# Patient Record
Sex: MALE | Race: WHITE | NOT HISPANIC OR LATINO | Employment: UNEMPLOYED | URBAN - METROPOLITAN AREA
[De-identification: names, ages, dates, MRNs, and addresses within clinical notes are randomized per-mention and may not be internally consistent; named-entity substitution may affect disease eponyms.]

---
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
## 2024-04-13 ENCOUNTER — OFFICE VISIT (OUTPATIENT)
Dept: URGENT CARE | Facility: CLINIC | Age: 19
End: 2024-04-13
Payer: COMMERCIAL

## 2024-04-13 VITALS
WEIGHT: 151 LBS | SYSTOLIC BLOOD PRESSURE: 122 MMHG | TEMPERATURE: 98.3 F | BODY MASS INDEX: 21.67 KG/M2 | DIASTOLIC BLOOD PRESSURE: 56 MMHG | RESPIRATION RATE: 18 BRPM | HEART RATE: 78 BPM | OXYGEN SATURATION: 98 %

## 2024-04-13 DIAGNOSIS — T78.40XA ALLERGIC REACTION, INITIAL ENCOUNTER: Primary | ICD-10-CM

## 2024-04-13 PROCEDURE — G0383 LEV 4 HOSP TYPE B ED VISIT: HCPCS

## 2024-04-13 RX ORDER — PREDNISONE 10 MG/1
TABLET ORAL DAILY
Qty: 30 TABLET | Refills: 0 | Status: SHIPPED | OUTPATIENT
Start: 2024-04-13 | End: 2024-04-23

## 2024-04-13 NOTE — PROGRESS NOTES
[facility]        NAME: Piotr Del Valle is a 18 y.o. male  : 2005    MRN: [id number]  DATE: 2024  TIME: 12:24 PM    Assessment and Plan   Allergic reaction, initial encounter [T78.40XA]  1. Allergic reaction, initial encounter  predniSONE 10 mg tablet            Patient Instructions       Follow up with PCP in 3-5 days.  Proceed to  ER if symptoms worsen.    If tests have been performed at [facility], our office will contact you with results if changes need to be made to the care plan discussed with you at the visit.  [facility]    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          History of Present Illness       18-year-old male presents for a rash x 3 days.  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  Patient admits shortly after that a rash developed.  Located on the right side of the face, behind bilateral ears, on the underside of the chin.  Patient admits shortness after his girlfriend told him that his face looked like he was swelling.  Unsure of what he came in contact with.  Denies any changes in soaps, detergents, laundry products, anything in general.  Denies any lip swelling or numbness, throat swelling or irritation, trouble breathing.    Rash  Pertinent negatives include no fever or shortness of breath.       Review of Systems   Review of Systems   Constitutional:  Negative for chills and fever.   HENT:  Positive for facial swelling. Negative for ear discharge, ear pain, trouble swallowing and voice change.    Respiratory:  Negative for shortness of breath.    Skin:  Positive for rash.         Current Medications       Current Outpatient Medications:     predniSONE 10 mg tablet, Take 5 tablets (50 mg total) by mouth daily for 2 days, THEN 4 tablets (40 mg total)  daily for 2 days, THEN 3 tablets (30 mg total) daily for 2 days, THEN 2 tablets (20 mg total) daily for 2 days, THEN 1 tablet (10 mg total) daily for 2 days., Disp: 30 tablet, Rfl: 0    ondansetron (ZOFRAN-ODT) 4 mg disintegrating tablet, Take 1 tablet (4 mg total) by mouth every 6 (six) hours as needed for vomiting or nausea, Disp: 20 tablet, Rfl: 0    Current Allergies     Allergies as of 04/13/2024    (No Known Allergies)            The following portions of the patient's history were reviewed and updated as appropriate: allergies, current medications, past family history, past medical history, past social history, past surgical history and problem list.     Past Medical History:   Diagnosis Date    Bed wetting        No past surgical history on file.    No family history on file.      Medications have been verified.        Objective   /56   Pulse 78   Temp 98.3 °F (36.8 °C)   Resp 18   Wt 68.5 kg (151 lb)   SpO2 98%   BMI 21.67 kg/m²   No LMP for male patient.       Physical Exam     Physical Exam  Vitals and nursing note reviewed.   Constitutional:       General: He is not in acute distress.     Appearance: He is not toxic-appearing.   HENT:      Head: Atraumatic.      Comments: Right-sided facial swelling  No tenderness palpation  No lip swelling     Right Ear: Tympanic membrane, ear canal and external ear normal.      Mouth/Throat:      Mouth: Mucous membranes are moist.      Pharynx: No oropharyngeal exudate or posterior oropharyngeal erythema.      Comments: No dental caries or abscesses  Eyes:      Conjunctiva/sclera: Conjunctivae normal.   Pulmonary:      Effort: Pulmonary effort is normal.   Musculoskeletal:      Cervical back: No tenderness.   Lymphadenopathy:      Cervical: No cervical adenopathy.   Skin:     Findings: Rash present.   Neurological:      Mental Status: He is alert.   Psychiatric:         Mood and Affect: Mood normal.         Behavior: Behavior normal.

## 2024-04-13 NOTE — PATIENT INSTRUCTIONS
Take steroid taper as directed  Use Benadryl as needed  Use famotidine/Pepcid as needed  Proceed to the ER if symptoms worsen or for any concerns  Follow-up with PCP in 3 to 5 days  
no palpitations/no syncope

## 2024-08-21 ENCOUNTER — OFFICE VISIT (OUTPATIENT)
Dept: URGENT CARE | Facility: CLINIC | Age: 19
End: 2024-08-21
Payer: COMMERCIAL

## 2024-08-21 VITALS
OXYGEN SATURATION: 98 % | RESPIRATION RATE: 18 BRPM | HEART RATE: 91 BPM | BODY MASS INDEX: 23.59 KG/M2 | TEMPERATURE: 98.2 F | WEIGHT: 164.4 LBS | SYSTOLIC BLOOD PRESSURE: 125 MMHG | DIASTOLIC BLOOD PRESSURE: 58 MMHG

## 2024-08-21 DIAGNOSIS — R50.9 FEVER, UNSPECIFIED FEVER CAUSE: ICD-10-CM

## 2024-08-21 DIAGNOSIS — J06.9 VIRAL URI: Primary | ICD-10-CM

## 2024-08-21 LAB
S PYO AG THROAT QL: NEGATIVE
SARS-COV-2 AG UPPER RESP QL IA: NEGATIVE
VALID CONTROL: NORMAL

## 2024-08-21 PROCEDURE — G0382 LEV 3 HOSP TYPE B ED VISIT: HCPCS

## 2024-08-21 PROCEDURE — 87811 SARS-COV-2 COVID19 W/OPTIC: CPT

## 2024-08-21 PROCEDURE — 87880 STREP A ASSAY W/OPTIC: CPT

## 2024-08-21 NOTE — LETTER
August 21, 2024     Patient: Piotr Del Valle   YOB: 2005   Date of Visit: 8/21/2024       To Whom it May Concern:    Piotr Del Valle was seen in my clinic on 8/21/2024. He may return to work on 8/26/2024 .    If you have any questions or concerns, please don't hesitate to call.         Sincerely,          BURKE Guadarrama        CC: No Recipients

## 2024-08-21 NOTE — PATIENT INSTRUCTIONS
Your rapid Covid and strep tests were negative.    Your symptoms are consistent with a viral illness.    For nasal/sinus congestion you can try steam, warm compresses, saline nasal spray, Neti pot, nasal steroid (Flonase, Nasocort), or nasal decongestant (Afrin - for 3 days only).    You can try a decongestant (Sudafed) if > 6 years of age and no history of high blood pressure.    For cough you can take an over-the-counter expectorant such as plain Robitussion or Mucinex. A spoonful of honey at bedtime may also be helpful.    For sore throat you can use Cepacol lozenges, do warm salt water gargles, drink warm water with lemon or herbal teas, or use an over-the-counter throat spray (Chloraseptic).    You can take ibuprofen/Motrin and acetaminophen/Tylenol as needed for pain, fever, body aches. Do not take ibuprofen/Motrin/Advil if you have a history of heart disease, bleeding ulcers, or if you take blood thinners.     Drink plenty of fluids to stay hydrated. Airborne for extra vitamin C and zinc.    Follow up with your PCP in 5-7 days for persistent symptoms.    Go to the ER if symptoms worsen.

## 2024-08-21 NOTE — PROGRESS NOTES
[facility]        NAME: Piotr Del Valle is a 18 y.o. male  : 2005    MRN: [id number]  DATE: 2024  TIME: 4:53 PM    Assessment and Plan   Viral URI [J06.9]  1. Viral URI        2. Fever, unspecified fever cause  Poct Covid 19 Rapid Antigen Test    POCT rapid ANTIGEN strepA            Patient Instructions     Your rapid Covid and strep tests were negative.    Your symptoms are consistent with a viral illness.    For nasal/sinus congestion you can try steam, warm compresses, saline nasal spray, Neti pot, nasal steroid (Flonase, Nasocort), or nasal decongestant (Afrin - for 3 days only).    You can try a decongestant (Sudafed) if > 6 years of age and no history of high blood pressure.    For cough you can take an over-the-counter expectorant such as plain Robitussion or Mucinex. A spoonful of honey at bedtime may also be helpful.    For sore throat you can use Cepacol lozenges, do warm salt water gargles, drink warm water with lemon or herbal teas, or use an over-the-counter throat spray (Chloraseptic).    You can take ibuprofen/Motrin and acetaminophen/Tylenol as needed for pain, fever, body aches. Do not take ibuprofen/Motrin/Advil if you have a history of heart disease, bleeding ulcers, or if you take blood thinners.     Drink plenty of fluids to stay hydrated. Airborne for extra vitamin C and zinc.    Follow up with your PCP in 5-7 days for persistent symptoms.    Go to the ER if symptoms worsen.     If tests are performed, our office will contact you with results only if changes need to made to the care plan discussed with you at the visit. You can review your full results on [facility].      Chief Complaint     Chief Complaint   Patient presents with    Fever     Patient states that he has been sick since Saturday and it is not getting better. He states that he had fevers yesterday, body aches and headaches.          History of Present Illness       18-year-old male presenting with  fevers Tmax 101, generalized fatigue, body aches, and intermittent headaches x 3-4 days.  Denies significant nasal congestion, runny nose, sore throat, and cough.   No GI symptoms or rashes.  Taking OTC medications for symptom relief.        Review of Systems   Review of Systems   Constitutional:  Positive for chills, fatigue and fever.   HENT:  Negative for congestion, ear pain, rhinorrhea, sinus pressure and sore throat.    Eyes:  Negative for discharge and redness.   Respiratory:  Negative for cough, chest tightness, shortness of breath and wheezing.    Cardiovascular:  Negative for chest pain and palpitations.   Gastrointestinal:  Negative for abdominal pain, diarrhea, nausea and vomiting.   Genitourinary:  Negative for dysuria and hematuria.   Skin:  Negative for pallor and rash.   Neurological:  Positive for headaches. Negative for dizziness and light-headedness.         Current Medications       Current Outpatient Medications:     ondansetron (ZOFRAN-ODT) 4 mg disintegrating tablet, Take 1 tablet (4 mg total) by mouth every 6 (six) hours as needed for vomiting or nausea (Patient not taking: Reported on 8/21/2024), Disp: 20 tablet, Rfl: 0    Current Allergies     Allergies as of 08/21/2024    (No Known Allergies)            The following portions of the patient's history were reviewed and updated as appropriate: allergies, current medications, past family history, past medical history, past social history, past surgical history and problem list.     Past Medical History:   Diagnosis Date    Bed wetting        History reviewed. No pertinent surgical history.    History reviewed. No pertinent family history.      Medications have been verified.        Objective   /58 (BP Location: Right arm, Patient Position: Sitting, Cuff Size: Adult)   Pulse 91   Temp 98.2 °F (36.8 °C)   Resp 18   Wt 74.6 kg (164 lb 6.4 oz)   SpO2 98%   BMI 23.59 kg/m²        Physical Exam     Physical Exam  Vitals and nursing note  reviewed.   Constitutional:       General: He is not in acute distress.     Appearance: He is not ill-appearing or diaphoretic.   HENT:      Head: Normocephalic and atraumatic.      Right Ear: Tympanic membrane, ear canal and external ear normal.      Left Ear: Tympanic membrane, ear canal and external ear normal.      Nose: Nose normal.      Mouth/Throat:      Mouth: Mucous membranes are moist.      Pharynx: Oropharynx is clear. No oropharyngeal exudate or posterior oropharyngeal erythema.   Eyes:      Conjunctiva/sclera: Conjunctivae normal.      Pupils: Pupils are equal, round, and reactive to light.   Cardiovascular:      Rate and Rhythm: Normal rate and regular rhythm.      Pulses: Normal pulses.      Heart sounds: Normal heart sounds.   Pulmonary:      Effort: Pulmonary effort is normal.      Breath sounds: Normal breath sounds. No wheezing, rhonchi or rales.   Musculoskeletal:         General: Normal range of motion.      Cervical back: Normal range of motion and neck supple.   Lymphadenopathy:      Cervical: No cervical adenopathy.   Skin:     General: Skin is warm and dry.      Capillary Refill: Capillary refill takes less than 2 seconds.   Neurological:      Mental Status: He is alert and oriented to person, place, and time.

## 2025-06-04 ENCOUNTER — OFFICE VISIT (OUTPATIENT)
Dept: URGENT CARE | Facility: CLINIC | Age: 20
End: 2025-06-04
Payer: COMMERCIAL

## 2025-06-04 VITALS
RESPIRATION RATE: 18 BRPM | TEMPERATURE: 97.4 F | BODY MASS INDEX: 21.42 KG/M2 | HEIGHT: 71 IN | SYSTOLIC BLOOD PRESSURE: 118 MMHG | DIASTOLIC BLOOD PRESSURE: 70 MMHG | HEART RATE: 74 BPM | WEIGHT: 153 LBS | OXYGEN SATURATION: 100 %

## 2025-06-04 DIAGNOSIS — R10.12 LEFT UPPER QUADRANT ABDOMINAL PAIN: ICD-10-CM

## 2025-06-04 DIAGNOSIS — K21.9 GASTROESOPHAGEAL REFLUX DISEASE WITHOUT ESOPHAGITIS: Primary | ICD-10-CM

## 2025-06-04 PROCEDURE — G0382 LEV 3 HOSP TYPE B ED VISIT: HCPCS | Performed by: PHYSICIAN ASSISTANT

## 2025-06-04 NOTE — PROGRESS NOTES
[facility]        NAME: Piotr Del Valle is a 19 y.o. male  : 2005    MRN: [id number]  DATE: 2025  TIME: 12:15 PM    Assessment and Plan   Gastroesophageal reflux disease without esophagitis [K21.9]  1. Gastroesophageal reflux disease without esophagitis        2. Left upper quadrant abdominal pain              Patient Instructions       Follow up with PCP in 3-5 days.  Proceed to  ER if symptoms worsen.    If tests have been performed at [facility], our office will contact you with results if changes need to be made to the care plan discussed with you at the visit.  [facility] on [facility].    Chief Complaint     Chief Complaint   Patient presents with    Abdominal Pain     Pt reports left sided upper abdominal pain that began 6 days ago. Reports 2 episodes of vomiting today. C/o burping and burning sensation on left side of abdomen. No meds for symptoms. Reports pain is worse when applying pressure to the area.          History of Present Illness       Patient presents with 6 days developing left upper quadrant stomach pains that worsened over a few days.  Had an episode of vomiting after 2 days.  Then this morning felt burning sensation up into the esophagus,  heartburn,  and vomited twice at work. Also with diarrhea twice per day, mild cough,   Pain rated 6/10 described as a burning sensation.  Laying down makes the pain worse.  Eating/drinking does not effect it.   Denies blood in vomiting, blood in stool, constipation, fevers, chest pains, changes to diet, changes to medications.   Has not tried anything for symptoms.          Abdominal Pain  Associated symptoms include diarrhea and vomiting. Pertinent negatives include no fever or nausea.       Review of Systems   Review of Systems   Constitutional:  Negative for fatigue and fever.   Respiratory:  Negative for shortness of breath.    Cardiovascular:  Negative for chest pain.   Gastrointestinal:  Positive for  "abdominal pain, diarrhea and vomiting. Negative for blood in stool and nausea.         Current Medications     Current Medications[1]    Current Allergies     Allergies as of 06/04/2025    (No Known Allergies)            The following portions of the patient's history were reviewed and updated as appropriate: allergies, current medications, past family history, past medical history, past social history, past surgical history and problem list.     Past Medical History[2]    Past Surgical History[3]    Family History[4]      Medications have been verified.        Objective   /70   Pulse 74   Temp (!) 97.4 °F (36.3 °C)   Resp 18   Ht 5' 11\" (1.803 m)   Wt 69.4 kg (153 lb)   SpO2 100%   BMI 21.34 kg/m²   No LMP for male patient.       Physical Exam     Physical Exam  Constitutional:       Appearance: He is well-developed.     Cardiovascular:      Rate and Rhythm: Normal rate and regular rhythm.      Heart sounds: Normal heart sounds.   Pulmonary:      Effort: Pulmonary effort is normal.   Abdominal:      General: Abdomen is flat. Bowel sounds are normal.      Palpations: Abdomen is soft.      Tenderness: There is abdominal tenderness in the left upper quadrant. There is no guarding or rebound.      Hernia: No hernia is present.     Neurological:      Mental Status: He is alert.     Psychiatric:         Mood and Affect: Mood normal.         Behavior: Behavior normal.                        [1]   Current Outpatient Medications:     ondansetron (ZOFRAN-ODT) 4 mg disintegrating tablet, Take 1 tablet (4 mg total) by mouth every 6 (six) hours as needed for vomiting or nausea (Patient not taking: Reported on 6/4/2025), Disp: 20 tablet, Rfl: 0  [2]   Past Medical History:  Diagnosis Date    Bed wetting    [3] No past surgical history on file.  [4] No family history on file.    "

## 2025-06-09 ENCOUNTER — APPOINTMENT (OUTPATIENT)
Dept: RADIOLOGY | Facility: HOSPITAL | Age: 20
End: 2025-06-09
Payer: COMMERCIAL

## 2025-06-09 ENCOUNTER — HOSPITAL ENCOUNTER (EMERGENCY)
Facility: HOSPITAL | Age: 20
Discharge: HOME | End: 2025-06-09
Attending: EMERGENCY MEDICINE | Admitting: EMERGENCY MEDICINE
Payer: COMMERCIAL

## 2025-06-09 VITALS
RESPIRATION RATE: 18 BRPM | HEART RATE: 65 BPM | TEMPERATURE: 98.2 F | SYSTOLIC BLOOD PRESSURE: 132 MMHG | BODY MASS INDEX: 20.6 KG/M2 | OXYGEN SATURATION: 99 % | DIASTOLIC BLOOD PRESSURE: 68 MMHG | WEIGHT: 147.71 LBS

## 2025-06-09 DIAGNOSIS — R11.0 NAUSEA: ICD-10-CM

## 2025-06-09 DIAGNOSIS — R63.4 UNINTENTIONAL WEIGHT LOSS: ICD-10-CM

## 2025-06-09 DIAGNOSIS — R07.89 CHEST WALL PAIN: Primary | ICD-10-CM

## 2025-06-09 DIAGNOSIS — R10.10 UPPER ABDOMINAL PAIN: ICD-10-CM

## 2025-06-09 LAB
ALBUMIN SERPL BCG-MCNC: 5 G/DL (ref 3.5–5)
ALP SERPL-CCNC: 46 U/L (ref 34–104)
ALT SERPL W P-5'-P-CCNC: 16 U/L (ref 7–52)
ANION GAP SERPL CALCULATED.3IONS-SCNC: 6 MMOL/L (ref 4–13)
AST SERPL W P-5'-P-CCNC: 18 U/L (ref 13–39)
BASOPHILS # BLD AUTO: 0.03 THOUSANDS/ÂΜL (ref 0–0.1)
BASOPHILS NFR BLD AUTO: 0 % (ref 0–1)
BILIRUB SERPL-MCNC: 0.69 MG/DL (ref 0.2–1)
BUN SERPL-MCNC: 10 MG/DL (ref 5–25)
CALCIUM SERPL-MCNC: 9.5 MG/DL (ref 8.4–10.2)
CARDIAC TROPONIN I PNL SERPL HS: <2 NG/L (ref ?–50)
CHLORIDE SERPL-SCNC: 105 MMOL/L (ref 96–108)
CO2 SERPL-SCNC: 29 MMOL/L (ref 21–32)
CREAT SERPL-MCNC: 0.99 MG/DL (ref 0.6–1.3)
D DIMER PPP FEU-MCNC: <0.27 UG/ML FEU
EOSINOPHIL # BLD AUTO: 0.07 THOUSAND/ÂΜL (ref 0–0.61)
EOSINOPHIL NFR BLD AUTO: 1 % (ref 0–6)
ERYTHROCYTE [DISTWIDTH] IN BLOOD BY AUTOMATED COUNT: 11.7 % (ref 11.6–15.1)
FLUAV RNA RESP QL NAA+PROBE: NEGATIVE
FLUBV RNA RESP QL NAA+PROBE: NEGATIVE
GFR SERPL CREATININE-BSD FRML MDRD: 109 ML/MIN/1.73SQ M
GLUCOSE SERPL-MCNC: 109 MG/DL (ref 65–140)
HCT VFR BLD AUTO: 43 % (ref 36.5–49.3)
HGB BLD-MCNC: 14.7 G/DL (ref 12–17)
IMM GRANULOCYTES # BLD AUTO: 0.04 THOUSAND/UL (ref 0–0.2)
IMM GRANULOCYTES NFR BLD AUTO: 0 % (ref 0–2)
LYMPHOCYTES # BLD AUTO: 2.27 THOUSANDS/ÂΜL (ref 0.6–4.47)
LYMPHOCYTES NFR BLD AUTO: 26 % (ref 14–44)
MAGNESIUM SERPL-MCNC: 1.8 MG/DL (ref 1.9–2.7)
MCH RBC QN AUTO: 31 PG (ref 26.8–34.3)
MCHC RBC AUTO-ENTMCNC: 34.2 G/DL (ref 31.4–37.4)
MCV RBC AUTO: 91 FL (ref 82–98)
MONOCYTES # BLD AUTO: 0.65 THOUSAND/ÂΜL (ref 0.17–1.22)
MONOCYTES NFR BLD AUTO: 7 % (ref 4–12)
NEUTROPHILS # BLD AUTO: 5.85 THOUSANDS/ÂΜL (ref 1.85–7.62)
NEUTS SEG NFR BLD AUTO: 66 % (ref 43–75)
NRBC BLD AUTO-RTO: 0 /100 WBCS
PLATELET # BLD AUTO: 246 THOUSANDS/UL (ref 149–390)
PMV BLD AUTO: 10.8 FL (ref 8.9–12.7)
POTASSIUM SERPL-SCNC: 3.2 MMOL/L (ref 3.5–5.3)
PROT SERPL-MCNC: 7.2 G/DL (ref 6.4–8.4)
RBC # BLD AUTO: 4.74 MILLION/UL (ref 3.88–5.62)
RSV RNA RESP QL NAA+PROBE: NEGATIVE
SARS-COV-2 RNA RESP QL NAA+PROBE: NEGATIVE
SODIUM SERPL-SCNC: 140 MMOL/L (ref 135–147)
TSH SERPL DL<=0.05 MIU/L-ACNC: 1.37 UIU/ML (ref 0.45–4.5)
WBC # BLD AUTO: 8.91 THOUSAND/UL (ref 4.31–10.16)

## 2025-06-09 PROCEDURE — 71046 X-RAY EXAM CHEST 2 VIEWS: CPT

## 2025-06-09 PROCEDURE — 86308 HETEROPHILE ANTIBODY SCREEN: CPT | Performed by: EMERGENCY MEDICINE

## 2025-06-09 PROCEDURE — 85025 COMPLETE CBC W/AUTO DIFF WBC: CPT | Performed by: EMERGENCY MEDICINE

## 2025-06-09 PROCEDURE — 36415 COLL VENOUS BLD VENIPUNCTURE: CPT | Performed by: EMERGENCY MEDICINE

## 2025-06-09 PROCEDURE — 99285 EMERGENCY DEPT VISIT HI MDM: CPT

## 2025-06-09 PROCEDURE — 85379 FIBRIN DEGRADATION QUANT: CPT | Performed by: EMERGENCY MEDICINE

## 2025-06-09 PROCEDURE — 80053 COMPREHEN METABOLIC PANEL: CPT | Performed by: EMERGENCY MEDICINE

## 2025-06-09 PROCEDURE — 0241U HB NFCT DS VIR RESP RNA 4 TRGT: CPT | Performed by: EMERGENCY MEDICINE

## 2025-06-09 PROCEDURE — 83735 ASSAY OF MAGNESIUM: CPT | Performed by: EMERGENCY MEDICINE

## 2025-06-09 PROCEDURE — 84484 ASSAY OF TROPONIN QUANT: CPT | Performed by: EMERGENCY MEDICINE

## 2025-06-09 PROCEDURE — 84443 ASSAY THYROID STIM HORMONE: CPT

## 2025-06-09 PROCEDURE — 93005 ELECTROCARDIOGRAM TRACING: CPT

## 2025-06-09 RX ORDER — FAMOTIDINE 20 MG/1
20 TABLET, FILM COATED ORAL 2 TIMES DAILY
Qty: 28 TABLET | Refills: 0 | Status: SHIPPED | OUTPATIENT
Start: 2025-06-09 | End: 2025-06-23

## 2025-06-09 RX ORDER — ONDANSETRON 4 MG/1
4 TABLET, ORALLY DISINTEGRATING ORAL ONCE
Status: COMPLETED | OUTPATIENT
Start: 2025-06-09 | End: 2025-06-09

## 2025-06-09 RX ORDER — ONDANSETRON 2 MG/ML
4 INJECTION INTRAMUSCULAR; INTRAVENOUS ONCE
Status: DISCONTINUED | OUTPATIENT
Start: 2025-06-09 | End: 2025-06-09

## 2025-06-09 RX ORDER — FAMOTIDINE 10 MG/ML
20 INJECTION, SOLUTION INTRAVENOUS ONCE
Status: DISCONTINUED | OUTPATIENT
Start: 2025-06-09 | End: 2025-06-09

## 2025-06-09 RX ORDER — ONDANSETRON 4 MG/1
4 TABLET, ORALLY DISINTEGRATING ORAL EVERY 6 HOURS PRN
Qty: 20 TABLET | Refills: 0 | Status: SHIPPED | OUTPATIENT
Start: 2025-06-09

## 2025-06-09 RX ORDER — SUCRALFATE 1 G/1
TABLET ORAL
Qty: 30 TABLET | Refills: 0 | Status: SHIPPED | OUTPATIENT
Start: 2025-06-09 | End: 2025-06-19

## 2025-06-09 RX ORDER — SUCRALFATE 1 G/1
1 TABLET ORAL ONCE
Status: COMPLETED | OUTPATIENT
Start: 2025-06-09 | End: 2025-06-09

## 2025-06-09 RX ADMIN — ONDANSETRON 4 MG: 4 TABLET, ORALLY DISINTEGRATING ORAL at 18:57

## 2025-06-09 RX ADMIN — SUCRALFATE 1 G: 1 TABLET ORAL at 18:57

## 2025-06-09 NOTE — ED PROVIDER NOTES
Time reflects when diagnosis was documented in both MDM as applicable and the Disposition within this note       Time User Action Codes Description Comment    6/9/2025  7:14 PM Shugars, Sammie Add [R07.89] Chest wall pain     6/9/2025  7:15 PM Shugars, Sammie Add [R11.0] Nausea     6/9/2025  7:18 PM Shugars, Sammie Add [R63.4] Unintentional weight loss     6/9/2025  7:18 PM Shugars, Sammie Add [R10.10] Upper abdominal pain           ED Disposition       ED Disposition   Discharge    Condition   Stable    Date/Time   Mon Jun 9, 2025  7:14 PM    Comment   Piotr Del Valle discharge to home/self care.                   Assessment & Plan       Medical Decision Making  DDx including but not limited to: Chest wall pain, pleurisy, costochondritis, less likely ACS or PE, GERD, gastritis, gastric ulcer, PUD    Wt Readings from Last 3 Encounters:  06/09/25 : 67 kg (147 lb 11.3 oz)  06/04/25 : 69.4 kg (153 lb)  08/21/24 : 74.6 kg (164 lb 6.4 oz) (63%, Z= 0.33)*    * Growth percentiles are based on CDC (Boys, 2-20 Years) data.     Patient with complaint of 1 week of pleuritic left lower chest wall pain with pain that is worse on inspiration and palpation.  Patient also with upper abdominal burning discomfort and intermittent nausea and vomiting.  Given chest pain, will obtain EKG to further evaluate for cardiac arrhythmia.  Will obtain chest x-ray to further evaluate for pneumothorax, although this is less likely given work of breathing and pulse ox 99%.  Will obtain labs to further evaluate for electrolyte derangement, dehydration, KUMAR, transaminitis, elevated lipase to assess for pancreatitis, and leukocytosis.  Soft, flat, benign abdominal exam, less suspicious of acute infectious or surgical intra-abdominal pathology.    EKG without evidence of cardiac arrhythmia, prolonged QT, or shortened MD.  Chest x-ray without pneumothorax or acute cardiopulmonary disease on ED wet read.  Labs reviewed and with note of mild hypokalemia and mild  "hypomagnesemia.  No evidence of dehydration or KUMAR or transaminitis or pancreatitis.  Given benign abdominal exam, with stability of labs and workup, will plan for discharge to home with close follow-up with GI.  Mononucleosis testing pending, TSH pending.  Discussed strict return precautions with the patient and his father, they are in agreement with plan and have no further questions at this time.    Problems Addressed:  Chest wall pain: acute illness or injury  Nausea: acute illness or injury  Unintentional weight loss: self-limited or minor problem     Details: 15 pounds over 8 months  Upper abdominal pain: acute illness or injury    Amount and/or Complexity of Data Reviewed  Independent Historian: parent  Labs: ordered.  Radiology: independent interpretation performed.  ECG/medicine tests: ordered and independent interpretation performed.    Risk  OTC drugs.  Prescription drug management.             Medications   sucralfate (CARAFATE) tablet 1 g (1 g Oral Given 6/9/25 1857)   ondansetron (ZOFRAN-ODT) dispersible tablet 4 mg (4 mg Oral Given 6/9/25 1857)       ED Risk Strat Scores      HEART Risk Score      Flowsheet Row Most Recent Value   Heart Score Risk Calculator    History 0 Filed at: 06/09/2025 1849   ECG 0 Filed at: 06/09/2025 1849   Age 0 Filed at: 06/09/2025 1849   Risk Factors 0 Filed at: 06/09/2025 1849   Troponin 0 Filed at: 06/09/2025 1849   HEART Score 0 Filed at: 06/09/2025 1849                CRAFFT      Flowsheet Row Most Recent Value   CRAFFT Initial Screen: During the past 12 months, did you:    1. Drink any alcohol (more than a few sips)?  Yes Filed at: 06/09/2025 1800   2. Smoke any marijuana or hashish No Filed at: 06/09/2025 1800   3. Use anything else to get high? (\"anything else\" includes illegal drugs, over the counter and prescription drugs, and things that you sniff or 'painting')? No Filed at: 06/09/2025 1800              No data recorded    PERC Rule for PE      Flowsheet Row Most " "Recent Value   PERC Rule for PE    Age >=50 0 Filed at: 06/09/2025 1849   HR >=100 0 Filed at: 06/09/2025 1849   O2 Sat on room air < 95% 0 Filed at: 06/09/2025 1849   History of PE or DVT 0 Filed at: 06/09/2025 1849   Recent trauma or surgery 0 Filed at: 06/09/2025 1849   Hemoptysis 0 Filed at: 06/09/2025 1849   Exogenous estrogen 0 Filed at: 06/09/2025 1849   Unilateral leg swelling 0 Filed at: 06/09/2025 1849   PERC Rule for PE Results 0 Filed at: 06/09/2025 1849                Wells' Criteria for PE      Flowsheet Row Most Recent Value   Wells' Criteria for PE    Clinical signs and symptoms of DVT 0 Filed at: 06/09/2025 1849   PE is primary diagnosis or equally likely 0 Filed at: 06/09/2025 1849   HR >100 0 Filed at: 06/09/2025 1849   Immobilization at least 3 days or Surgery in the previous 4 weeks 0 Filed at: 06/09/2025 1849   Previous, objectively diagnosed PE or DVT 0 Filed at: 06/09/2025 1849   Hemoptysis 0 Filed at: 06/09/2025 1849   Malignancy with treatment within 6 months or palliative 0 Filed at: 06/09/2025 1849   Wells' Criteria Total 0 Filed at: 06/09/2025 1849                        History of Present Illness       Chief Complaint   Patient presents with    Shortness of Breath     Pt states \"for the past week I have been struggling to breathe, having chest pain, and fatigued. Pt reports that CP worsens with a deep breath. Pt states that he was seen at  on Friday and was told that he had a significant weight loss. Pt states that \" said it's likely a gas problem and if it doesn't go away to go to the hospital\". Pt denies any fevers.        Past Medical History[1]   Past Surgical History[2]   Family History[3]   Social History[4]   E-Cigarette/Vaping    E-Cigarette Use Current Every Day User       E-Cigarette/Vaping Substances    Nicotine Yes       I have reviewed and agree with the history as documented.     The patient is a 19-year-old male with no significant PMH presenting for evaluation of 1 " week of lower chest pain and N/V.  The patient reports starting 1 week ago with discomfort to his lower left chest that is worse on touching it or taking deep breaths.  He reports associated nausea with vomiting approximately 4 times last week (undigested food, no blood or coffee-ground appearance).  The patient reports having gone to urgent care and was told to come to the ER if symptoms were worsening.  Due to worsening pain today, he presents to the ER for further evaluation.  He denies fevers, chills, headache, head trauma, sore throat, nasal congestion, and cough.  He endorses shortness of breath and difficulty breathing secondary to the chest pain.  He denies palpitations, flank pain, urinary complaints, lower abdominal pain.  He does report constipation alternating with diarrhea.  He denies blood or dark black tarry appearance of stool.      History provided by:  Patient and parent   used: No    Shortness of Breath  Associated symptoms: abdominal pain and chest pain    Associated symptoms: no cough, no fever, no headaches, no rash and no vomiting        Review of Systems   Constitutional:  Positive for unexpected weight change. Negative for chills and fever.   Respiratory:  Positive for shortness of breath. Negative for cough.    Cardiovascular:  Positive for chest pain.   Gastrointestinal:  Positive for abdominal pain and diarrhea (Looser than normal stools). Negative for nausea and vomiting.   Musculoskeletal:  Negative for back pain and gait problem.   Skin:  Negative for rash and wound.   Neurological:  Negative for dizziness, syncope, weakness, light-headedness and headaches.           Objective       ED Triage Vitals [06/09/25 1759]   Temperature Pulse Blood Pressure Respirations SpO2 Patient Position - Orthostatic VS   98.2 °F (36.8 °C) 65 132/68 18 99 % Sitting      Temp Source Heart Rate Source BP Location FiO2 (%) Pain Score    Temporal Monitor Left arm -- 7      Vitals      Date  and Time Temp Pulse SpO2 Resp BP Pain Score FACES Pain Rating User   06/09/25 1759 98.2 °F (36.8 °C) 65 99 % 18 132/68 7 -- RN            Physical Exam  Vitals and nursing note reviewed.   Constitutional:       General: He is not in acute distress.     Appearance: Normal appearance. He is well-developed. He is not ill-appearing, toxic-appearing or diaphoretic.   HENT:      Head: Normocephalic and atraumatic.      Jaw: There is normal jaw occlusion.      Nose: Nose normal.      Mouth/Throat:      Lips: Pink.      Mouth: Mucous membranes are moist.     Eyes:      Extraocular Movements: Extraocular movements intact.      Conjunctiva/sclera: Conjunctivae normal.       Cardiovascular:      Rate and Rhythm: Normal rate and regular rhythm.      Pulses:           Radial pulses are 2+ on the right side and 2+ on the left side.        Dorsalis pedis pulses are 2+ on the right side and 2+ on the left side.      Heart sounds: Normal heart sounds, S1 normal and S2 normal.   Pulmonary:      Effort: Pulmonary effort is normal. No respiratory distress.      Breath sounds: Normal breath sounds and air entry.   Chest:      Chest wall: Tenderness present. No deformity, swelling or crepitus.     Abdominal:      General: There is no distension.      Palpations: Abdomen is soft.      Tenderness: There is no abdominal tenderness. There is no right CVA tenderness, left CVA tenderness, guarding or rebound.     Musculoskeletal:         General: Normal range of motion.      Cervical back: Neck supple.      Right lower leg: No edema.      Left lower leg: No edema.     Skin:     General: Skin is warm and dry.      Capillary Refill: Capillary refill takes less than 2 seconds.      Findings: No rash or wound.     Neurological:      General: No focal deficit present.      Mental Status: He is alert and oriented to person, place, and time. Mental status is at baseline.         Results Reviewed       Procedure Component Value Units Date/Time    TSH,  3rd generation with Free T4 reflex [[id number]] Collected: 06/09/25 1809    Lab Status: In process Specimen: Blood from Arm, Right Updated: 06/09/25 1923    FLU/RSV/COVID - if FLU/RSV clinically relevant (2hr TAT) [[id number]]  (Normal) Collected: 06/09/25 1809    Lab Status: Final result Specimen: Nares from Nose Updated: 06/09/25 1855     SARS-CoV-2 Negative     INFLUENZA A PCR Negative     INFLUENZA B PCR Negative     RSV PCR Negative    Narrative:      This test has been performed using the CoV-2/Flu/RSV plus assay on the [employer] platform. This test has been validated by the  and verified by the performing laboratory.     This test is designed to amplify and detect the following: nucleocapsid (N), envelope (E), and RNA-dependent RNA polymerase (RdRP) genes of the SARS-CoV-2 genome; matrix (M), basic polymerase (PB2), and acidic protein (PA) segments of the influenza A genome; matrix (M) and non-structural protein (NS) segments of the influenza B genome, and the nucleocapsid genes of RSV A and RSV B.     Positive results are indicative of the presence of Flu A, Flu B, RSV, and/or SARS-CoV-2 RNA. Positive results for SARS-CoV-2 or suspected novel influenza should be reported to state, local, or federal health departments according to local reporting requirements.      All results should be assessed in conjunction with clinical presentation and other laboratory markers for clinical management.     FOR PEDIATRIC PATIENTS - copy/paste COVID Guidelines URL to browser: [email or web address]       D-Dimer [[id number]]  (Normal) Collected: 06/09/25 1809    Lab Status: Final result Specimen: Blood from Arm, Right Updated: 06/09/25 1844     D-Dimer, Quant <0.27 ug/ml FEU     HS Troponin 0hr (reflex protocol) [[id number]]  (Normal) Collected: 06/09/25 1809    Lab Status: Final result Specimen: Blood from Arm, Right Updated: 06/09/25 1840     hs TnI 0hr  <2 ng/L     Mononucleosis screen [[id number]] Collected: 06/09/25 1834    Lab Status: In process Specimen: Blood from Arm, Right Updated: 06/09/25 1837    Comprehensive metabolic panel [[id number]]  (Abnormal) Collected: 06/09/25 1809    Lab Status: Final result Specimen: Blood from Arm, Right Updated: 06/09/25 1833     Sodium 140 mmol/L      Potassium 3.2 mmol/L      Chloride 105 mmol/L      CO2 29 mmol/L      ANION GAP 6 mmol/L      BUN 10 mg/dL      Creatinine 0.99 mg/dL      Glucose 109 mg/dL      Calcium 9.5 mg/dL      AST 18 U/L      ALT 16 U/L      Alkaline Phosphatase 46 U/L      Total Protein 7.2 g/dL      Albumin 5.0 g/dL      Total Bilirubin 0.69 mg/dL      eGFR 109 ml/min/1.73sq m     Narrative:      National Kidney Disease Foundation guidelines for Chronic Kidney Disease (CKD):     Stage 1 with normal or high GFR (GFR > 90 mL/min/1.73 square meters)    Stage 2 Mild CKD (GFR = 60-89 mL/min/1.73 square meters)    Stage 3A Moderate CKD (GFR = 45-59 mL/min/1.73 square meters)    Stage 3B Moderate CKD (GFR = 30-44 mL/min/1.73 square meters)    Stage 4 Severe CKD (GFR = 15-29 mL/min/1.73 square meters)    Stage 5 End Stage CKD (GFR <15 mL/min/1.73 square meters)  Note: GFR calculation is accurate only with a steady state creatinine    Magnesium [[id number]]  (Abnormal) Collected: 06/09/25 1809    Lab Status: Final result Specimen: Blood from Arm, Right Updated: 06/09/25 1833     Magnesium 1.8 mg/dL     CBC and differential [[id number]] Collected: 06/09/25 1809    Lab Status: Final result Specimen: Blood from Arm, Right Updated: 06/09/25 1819     WBC 8.91 Thousand/uL      RBC 4.74 Million/uL      Hemoglobin 14.7 g/dL      Hematocrit 43.0 %      MCV 91 fL      MCH 31.0 pg      MCHC 34.2 g/dL      RDW 11.7 %      MPV 10.8 fL      Platelets 246 Thousands/uL      nRBC 0 /100 WBCs      Segmented % 66 %      Immature Grans % 0 %      Lymphocytes % 26 %      Monocytes % 7 %      Eosinophils Relative 1 %      Basophils  Relative 0 %      Absolute Neutrophils 5.85 Thousands/µL      Absolute Immature Grans 0.04 Thousand/uL      Absolute Lymphocytes 2.27 Thousands/µL      Absolute Monocytes 0.65 Thousand/µL      Eosinophils Absolute 0.07 Thousand/µL      Basophils Absolute 0.03 Thousands/µL             XR chest 2 views   ED Interpretation by BURKE Smith (06/09 1848)   No acute cardiopulmonary disease identified by me.          ECG 12 Lead Documentation Only    Date/Time: 6/9/2025 6:12 PM    Performed by: BURKE Smith  Authorized by: BURKE Smith    Indications / Diagnosis:  SOB  ECG reviewed by me, the ED Provider: yes    Patient location:  ED  Previous ECG:     Previous ECG:  Unavailable    Comparison to cardiac monitor: Yes    Interpretation:     Interpretation: normal    Rate:     ECG rate:  70    ECG rate assessment: normal    Rhythm:     Rhythm: sinus rhythm    Ectopy:     Ectopy: none    QRS:     QRS axis:  Normal    QRS intervals:  Normal  Conduction:     Conduction: normal    ST segments:     ST segments:  Normal  T waves:     T waves: non-specific    Comments:      Sinus rhythm, normal axis, normal intervals, no acute ischemic changes read by me      ED Medication and Procedure Management   Prior to Admission Medications   Prescriptions Last Dose Informant Patient Reported? Taking?   ondansetron (ZOFRAN-ODT) 4 mg disintegrating tablet   No No   Sig: Take 1 tablet (4 mg total) by mouth every 6 (six) hours as needed for vomiting or nausea   Patient not taking: Reported on 6/4/2025      Facility-Administered Medications: None     Patient's Medications   Discharge Prescriptions    DICLOFENAC SODIUM (VOLTAREN) 1 %    Apply 2 g topically 4 (four) times a day For pain to affected area       Start Date: 6/9/2025  End Date: --       Order Dose: 2 g       Quantity: 100 g    Refills: 0    FAMOTIDINE (PEPCID) 20 MG TABLET    Take 1 tablet (20 mg total) by mouth 2 (two) times a day for 14 days       Start Date: 6/9/2025   End Date: 6/23/2025       Order Dose: 20 mg       Quantity: 28 tablet    Refills: 0    ONDANSETRON (ZOFRAN-ODT) 4 MG DISINTEGRATING TABLET    Take 1 tablet (4 mg total) by mouth every 6 (six) hours as needed for nausea or vomiting       Start Date: 6/9/2025  End Date: --       Order Dose: 4 mg       Quantity: 20 tablet    Refills: 0    SUCRALFATE (CARAFATE) 1 G TABLET    Take 1 tablet (1 g total) by mouth 3 (three) times a day with meals for 5 days, THEN 1 tablet (1 g total) 3 (three) times a day as needed (abdominal discomfort, burning pain) for up to 5 days.       Start Date: 6/9/2025  End Date: 6/19/2025       Order Dose: --       Quantity: 30 tablet    Refills: 0       ED SEPSIS DOCUMENTATION   Time reflects when diagnosis was documented in both MDM as applicable and the Disposition within this note       Time User Action Codes Description Comment    6/9/2025  7:14 PM Sammie Regalado Add [R07.89] Chest wall pain     6/9/2025  7:15 PM Albertina Regaladoa Add [R11.0] Nausea     6/9/2025  7:18 PM Sabine Sammie Add [R63.4] Unintentional weight loss     6/9/2025  7:18 PM Shtravis Sammie Add [R10.10] Upper abdominal pain                      [1]   Past Medical History:  Diagnosis Date    Bed wetting    [2] No past surgical history on file.  [3] No family history on file.  [4]   Social History  Tobacco Use    Smoking status: Every Day     Current packs/day: 0.50     Average packs/day: 0.5 packs/day for 4.5 years (2.2 ttl pk-yrs)     Types: Cigarettes     Start date: 12/20/2020     Passive exposure: Yes    Smokeless tobacco: Never   Vaping Use    Vaping status: Every Day    Substances: Nicotine   Substance Use Topics    Alcohol use: Yes    Drug use: Yes     Types: Marijuana        BURKE Smith  06/09/25 1928

## 2025-06-09 NOTE — DISCHARGE INSTRUCTIONS
Use the prescribed medications as directed.  If chest pain persists, take 1000 mg of acetaminophen (Tylenol) up to 3 times a day and/or 600 mg ibuprofen (Motrin) with food every 6 hours as needed.  Follow-up with GI for reevaluation of your upper abdominal pain and intermittent nausea and vomiting.  Follow-up with primary care for your intermittent passing out.    Return to the ER if develop fever, new or worsening pain, difficulty breathing, persistent vomiting, weakness, confusion, lethargy.

## 2025-06-10 LAB
ATRIAL RATE: 70 BPM
HETEROPH AB SER QL: NEGATIVE
P AXIS: 70 DEGREES
PR INTERVAL: 136 MS
QRS AXIS: 83 DEGREES
QRSD INTERVAL: 92 MS
QT INTERVAL: 366 MS
QTC INTERVAL: 395 MS
T WAVE AXIS: 67 DEGREES
VENTRICULAR RATE: 70 BPM